# Patient Record
Sex: FEMALE | Race: WHITE
[De-identification: names, ages, dates, MRNs, and addresses within clinical notes are randomized per-mention and may not be internally consistent; named-entity substitution may affect disease eponyms.]

---

## 2021-03-26 ENCOUNTER — HOSPITAL ENCOUNTER (OUTPATIENT)
Dept: HOSPITAL 96 - M.CRD | Age: 51
End: 2021-03-26
Attending: INTERNAL MEDICINE
Payer: COMMERCIAL

## 2021-03-26 DIAGNOSIS — Z17.1: ICD-10-CM

## 2021-03-26 DIAGNOSIS — C50.912: Primary | ICD-10-CM

## 2021-03-26 NOTE — 2DMMODE
[place] [address]
Phone:  [phone] 2 D/M-MODE ECHOCARDIOGRAM     
_______________________________________________________________________________
 
Name:         [facility]                  Room:                     REG CLI
M.R.#:    [id number]     Account #:     [id number]  
Admission:    21    Attend Phys:   Jonathan Hobson MD   
Discharge:                Date of Birth: [birth year]  
Date of Service: 21 1106  Report #:      [id number]
        [id number]
_______________________________________________________________________________
THIS REPORT FOR:
 
cc:  Leonarda Hale Bethany J. Holkins,Jesus LEVY MD [facility]       
                                                                       ~
 
--------------- APPROVED REPORT --------------
 
 
Study performed:  2021 10:29:11
 
EXAM: Comprehensive 2D, Doppler, and color-flow 
Echocardiogram 
Patient Location: Out-Patient   
      Status:  routine
 
   BSA:         1.82
HR: 72 bpm  
Rhythm: NSR  
 
Other Information 
Study Quality: Good
 
Indications
Chemo
 
2D Dimensions
IVSd:  6.78 (7-11mm) LVOT Diam:  19.85 (18-24mm) 
LVDd:  39.36 mm  
PWd:  8.92 (7-11mm) Ascending Ao:  28.70 (22-36mm)
LVDs:  20.40 (25-40mm) 
Aortic Root:  28.99 mm 
 
Volumes
Left Atrial Volume (Systole) 
    LA ESV Index:  16.50 mL/m2
 
Aortic Valve
AoV Peak Yan.:  1.39 m/s 
AO Peak Gr.:  7.77 mmHg  LVOT Max PG:  3.45 mmHg
AO Mean Gr.:  4.54 mmHg  LVOT Mean P.11 mmHg
    LVOT Max V:  0.93 m/s
AO V2 VTI:  27.48 cm  LVOT Mean V:  0.69 m/s
NEYMAR (VTI):  2.16 cm2  LVOT V1 VTI:  19.15 cm
 
 
 
[place] [address]
Phone:  [phone]                     2 D/M-MODE ECHOCARDIOGRAM     
_______________________________________________________________________________
 
Name:         [facility]                  Room:                     REG CLI
M.R.#:    [id number]     Account #:     [id number]  
Admission:    21    Attend Phys:   Jonathan Hobson MD   
Discharge:                Date of Birth: [birth year]  
Date of Service: 21 1106  Report #:      [id number]
        [id number]
_______________________________________________________________________________
Mitral Valve
    E/A Ratio:  1.17
    MV Decel. Time:  215.48 ms
MV E Max Yan.:  0.85 m/s 
MV PHT:  62.49 ms  
MVA (PHT):  3.52 cm2  
 
TDI
E/Lateral E':  5.67 E/Medial E':  8.50
   Medial E' Yan.:  0.10 m/s
   Lateral E' Yan.:  0.15 m/s
 
Pulmonary Valve
PV Peak Yan.:  0.93 m/s PV Peak Gr.:  3.43 mmHg
 
Left Ventricle
The left ventricle is normal size. There is normal LV segmental wall 
motion. There is normal left ventricular wall thickness. Left 
ventricular systolic function is normal. The left ventricular 
ejection fraction is within the normal range. LVEF is 60-65%. Grade I 
- abnormal relaxation pattern.
 
Right Ventricle
The right ventricle is normal size. The right ventricular systolic 
function is normal.
 
Atria
The left atrium size is normal. The right atrium size is 
normal.
 
Aortic Valve
The aortic valve is normal in structure. No aortic regurgitation is 
present. There is no aortic valvular stenosis.
 
Mitral Valve
The mitral valve is normal in structure. There is no mitral valve 
regurgitation noted. No evidence of mitral valve stenosis.
 
Tricuspid Valve
The tricuspid valve is normal in structure. Unable to assess PA 
pressure. Trace tricuspid regurgitation.
 
Pulmonic Valve
The pulmonary valve is normal in structure. There is no pulmonic 
valvular regurgitation.
 
 
 
[place] [address]
Phone:  [phone]                     2 D/M-MODE ECHOCARDIOGRAM     
_______________________________________________________________________________
 
Name:         [facility]                  Room:                     REG CLI
M.R.#:    [id number]     Account #:     [id number]  
Admission:    21    Attend Phys:   Jonathan Hobson MD   
Discharge:                Date of Birth: [birth year]  
Date of Service: 21 1106  Report #:      [id number]
        [id number]
_______________________________________________________________________________
Great Vessels
The aortic root is normal in size. IVC is normal in size and 
collapses >50% with inspiration.
 
Pericardium
There is no pericardial effusion.
 
<Conclusion>
The left ventricle is normal size.
Left ventricular systolic function is normal.
The left ventricular ejection fraction is within the normal 
range.
LVEF is 60-65%.
Grade I - abnormal relaxation pattern.
The right ventricle is normal size.
The left atrium size is normal.
The aortic valve is normal in structure.
The mitral valve is normal in structure.
The tricuspid valve is normal in structure.
IVC is normal in size and collapses >50% with inspiration.
There is no pericardial effusion.
There is normal LV segmental wall motion.
 
 
 
 
 
 
 
 
 
 
 
 
 
 
 
 
 
 
 
 
 
 
  <ELECTRONICALLY SIGNED>
                                           By: Jesus Dejesus MD, [facility]     
  21     1106
D: 21 1106   _____________________________________
T: 21 1106   Jesus Dejesus MD, FACC       /INF